# Patient Record
Sex: FEMALE | Race: WHITE | NOT HISPANIC OR LATINO | URBAN - METROPOLITAN AREA
[De-identification: names, ages, dates, MRNs, and addresses within clinical notes are randomized per-mention and may not be internally consistent; named-entity substitution may affect disease eponyms.]

---

## 2019-08-19 ENCOUNTER — OUTPATIENT (OUTPATIENT)
Dept: OUTPATIENT SERVICES | Facility: HOSPITAL | Age: 49
LOS: 1 days | Discharge: HOME | End: 2019-08-19
Payer: COMMERCIAL

## 2019-08-19 VITALS
TEMPERATURE: 97 F | RESPIRATION RATE: 16 BRPM | DIASTOLIC BLOOD PRESSURE: 71 MMHG | SYSTOLIC BLOOD PRESSURE: 116 MMHG | HEIGHT: 60 IN | OXYGEN SATURATION: 99 % | WEIGHT: 145.06 LBS | HEART RATE: 80 BPM

## 2019-08-19 DIAGNOSIS — Z98.890 OTHER SPECIFIED POSTPROCEDURAL STATES: Chronic | ICD-10-CM

## 2019-08-19 DIAGNOSIS — Z01.818 ENCOUNTER FOR OTHER PREPROCEDURAL EXAMINATION: ICD-10-CM

## 2019-08-19 DIAGNOSIS — M20.22 HALLUX RIGIDUS, LEFT FOOT: ICD-10-CM

## 2019-08-19 LAB
ALBUMIN SERPL ELPH-MCNC: 4.6 G/DL — SIGNIFICANT CHANGE UP (ref 3.5–5.2)
ALP SERPL-CCNC: 99 U/L — SIGNIFICANT CHANGE UP (ref 30–115)
ALT FLD-CCNC: 100 U/L — HIGH (ref 0–41)
ANION GAP SERPL CALC-SCNC: 14 MMOL/L — SIGNIFICANT CHANGE UP (ref 7–14)
APPEARANCE UR: ABNORMAL
AST SERPL-CCNC: 64 U/L — HIGH (ref 0–41)
BACTERIA # UR AUTO: ABNORMAL
BASOPHILS # BLD AUTO: 0.04 K/UL — SIGNIFICANT CHANGE UP (ref 0–0.2)
BASOPHILS NFR BLD AUTO: 0.8 % — SIGNIFICANT CHANGE UP (ref 0–1)
BILIRUB SERPL-MCNC: 0.5 MG/DL — SIGNIFICANT CHANGE UP (ref 0.2–1.2)
BILIRUB UR-MCNC: NEGATIVE — SIGNIFICANT CHANGE UP
BUN SERPL-MCNC: 11 MG/DL — SIGNIFICANT CHANGE UP (ref 10–20)
CALCIUM SERPL-MCNC: 9.4 MG/DL — SIGNIFICANT CHANGE UP (ref 8.5–10.1)
CHLORIDE SERPL-SCNC: 102 MMOL/L — SIGNIFICANT CHANGE UP (ref 98–110)
CO2 SERPL-SCNC: 28 MMOL/L — SIGNIFICANT CHANGE UP (ref 17–32)
COLOR SPEC: YELLOW — SIGNIFICANT CHANGE UP
CREAT SERPL-MCNC: 0.8 MG/DL — SIGNIFICANT CHANGE UP (ref 0.7–1.5)
DIFF PNL FLD: NEGATIVE — SIGNIFICANT CHANGE UP
EOSINOPHIL # BLD AUTO: 0.08 K/UL — SIGNIFICANT CHANGE UP (ref 0–0.7)
EOSINOPHIL NFR BLD AUTO: 1.6 % — SIGNIFICANT CHANGE UP (ref 0–8)
EPI CELLS # UR: 12 /HPF — HIGH (ref 0–5)
GLUCOSE SERPL-MCNC: 122 MG/DL — HIGH (ref 70–99)
GLUCOSE UR QL: NEGATIVE — SIGNIFICANT CHANGE UP
HCT VFR BLD CALC: 41.6 % — SIGNIFICANT CHANGE UP (ref 37–47)
HGB BLD-MCNC: 13.8 G/DL — SIGNIFICANT CHANGE UP (ref 12–16)
HYALINE CASTS # UR AUTO: NEGATIVE /LPF — SIGNIFICANT CHANGE UP (ref 0–7)
IMM GRANULOCYTES NFR BLD AUTO: 0.4 % — HIGH (ref 0.1–0.3)
KETONES UR-MCNC: NEGATIVE — SIGNIFICANT CHANGE UP
LEUKOCYTE ESTERASE UR-ACNC: ABNORMAL
LYMPHOCYTES # BLD AUTO: 1.34 K/UL — SIGNIFICANT CHANGE UP (ref 1.2–3.4)
LYMPHOCYTES # BLD AUTO: 26.2 % — SIGNIFICANT CHANGE UP (ref 20.5–51.1)
MCHC RBC-ENTMCNC: 28.3 PG — SIGNIFICANT CHANGE UP (ref 27–31)
MCHC RBC-ENTMCNC: 33.2 G/DL — SIGNIFICANT CHANGE UP (ref 32–37)
MCV RBC AUTO: 85.4 FL — SIGNIFICANT CHANGE UP (ref 81–99)
MONOCYTES # BLD AUTO: 0.37 K/UL — SIGNIFICANT CHANGE UP (ref 0.1–0.6)
MONOCYTES NFR BLD AUTO: 7.2 % — SIGNIFICANT CHANGE UP (ref 1.7–9.3)
NEUTROPHILS # BLD AUTO: 3.27 K/UL — SIGNIFICANT CHANGE UP (ref 1.4–6.5)
NEUTROPHILS NFR BLD AUTO: 63.8 % — SIGNIFICANT CHANGE UP (ref 42.2–75.2)
NITRITE UR-MCNC: NEGATIVE — SIGNIFICANT CHANGE UP
NRBC # BLD: 0 /100 WBCS — SIGNIFICANT CHANGE UP (ref 0–0)
PH UR: 6.5 — SIGNIFICANT CHANGE UP (ref 5–8)
PLATELET # BLD AUTO: 206 K/UL — SIGNIFICANT CHANGE UP (ref 130–400)
POTASSIUM SERPL-MCNC: 4.3 MMOL/L — SIGNIFICANT CHANGE UP (ref 3.5–5)
POTASSIUM SERPL-SCNC: 4.3 MMOL/L — SIGNIFICANT CHANGE UP (ref 3.5–5)
PROT SERPL-MCNC: 6.9 G/DL — SIGNIFICANT CHANGE UP (ref 6–8)
PROT UR-MCNC: NEGATIVE — SIGNIFICANT CHANGE UP
RBC # BLD: 4.87 M/UL — SIGNIFICANT CHANGE UP (ref 4.2–5.4)
RBC # FLD: 12.1 % — SIGNIFICANT CHANGE UP (ref 11.5–14.5)
RBC CASTS # UR COMP ASSIST: 2 /HPF — SIGNIFICANT CHANGE UP (ref 0–4)
SODIUM SERPL-SCNC: 144 MMOL/L — SIGNIFICANT CHANGE UP (ref 135–146)
SP GR SPEC: 1.02 — SIGNIFICANT CHANGE UP (ref 1.01–1.02)
UROBILINOGEN FLD QL: SIGNIFICANT CHANGE UP
WBC # BLD: 5.12 K/UL — SIGNIFICANT CHANGE UP (ref 4.8–10.8)
WBC # FLD AUTO: 5.12 K/UL — SIGNIFICANT CHANGE UP (ref 4.8–10.8)
WBC UR QL: 17 /HPF — HIGH (ref 0–5)

## 2019-08-19 PROCEDURE — 93010 ELECTROCARDIOGRAM REPORT: CPT

## 2019-08-19 NOTE — H&P PST ADULT - REASON FOR ADMISSION
49 yo female presents with c/o "painful bone on my left foot, big toe, he is going to shave the bone";  denies chest pain, palpitations, shortness of breath, dyspnea, or dysuria. exercise tolerance: 2+ blocks/ flights of stairs w/o sob

## 2019-08-22 ENCOUNTER — RESULT REVIEW (OUTPATIENT)
Age: 49
End: 2019-08-22

## 2019-08-22 ENCOUNTER — OUTPATIENT (OUTPATIENT)
Dept: OUTPATIENT SERVICES | Facility: HOSPITAL | Age: 49
LOS: 1 days | Discharge: HOME | End: 2019-08-22
Payer: COMMERCIAL

## 2019-08-22 VITALS
OXYGEN SATURATION: 99 % | HEIGHT: 60 IN | DIASTOLIC BLOOD PRESSURE: 70 MMHG | HEART RATE: 77 BPM | RESPIRATION RATE: 18 BRPM | TEMPERATURE: 98 F | WEIGHT: 145.06 LBS | SYSTOLIC BLOOD PRESSURE: 117 MMHG

## 2019-08-22 VITALS
RESPIRATION RATE: 22 BRPM | SYSTOLIC BLOOD PRESSURE: 118 MMHG | DIASTOLIC BLOOD PRESSURE: 59 MMHG | HEART RATE: 69 BPM | OXYGEN SATURATION: 100 %

## 2019-08-22 DIAGNOSIS — Z98.890 OTHER SPECIFIED POSTPROCEDURAL STATES: Chronic | ICD-10-CM

## 2019-08-22 PROCEDURE — 88311 DECALCIFY TISSUE: CPT | Mod: 26

## 2019-08-22 PROCEDURE — 88304 TISSUE EXAM BY PATHOLOGIST: CPT | Mod: 26

## 2019-08-22 RX ORDER — SODIUM CHLORIDE 9 MG/ML
1000 INJECTION, SOLUTION INTRAVENOUS
Refills: 0 | Status: DISCONTINUED | OUTPATIENT
Start: 2019-08-22 | End: 2019-09-14

## 2019-08-22 RX ORDER — HYDROMORPHONE HYDROCHLORIDE 2 MG/ML
0.5 INJECTION INTRAMUSCULAR; INTRAVENOUS; SUBCUTANEOUS
Refills: 0 | Status: DISCONTINUED | OUTPATIENT
Start: 2019-08-22 | End: 2019-08-22

## 2019-08-22 RX ORDER — ONDANSETRON 8 MG/1
4 TABLET, FILM COATED ORAL ONCE
Refills: 0 | Status: DISCONTINUED | OUTPATIENT
Start: 2019-08-22 | End: 2019-09-14

## 2019-08-22 RX ORDER — OXYCODONE AND ACETAMINOPHEN 5; 325 MG/1; MG/1
1 TABLET ORAL ONCE
Refills: 0 | Status: DISCONTINUED | OUTPATIENT
Start: 2019-08-22 | End: 2019-08-22

## 2019-08-22 RX ADMIN — OXYCODONE AND ACETAMINOPHEN 1 TABLET(S): 5; 325 TABLET ORAL at 11:05

## 2019-08-22 RX ADMIN — SODIUM CHLORIDE 100 MILLILITER(S): 9 INJECTION, SOLUTION INTRAVENOUS at 09:48

## 2019-08-22 NOTE — ASU DISCHARGE PLAN (ADULT/PEDIATRIC) - CALL YOUR DOCTOR IF YOU HAVE ANY OF THE FOLLOWING:
Nausea and vomiting that does not stop/Unable to urinate/Numbness, tingling, color or temperature change to extremity/Fever greater than (need to indicate Fahrenheit or Celsius)/Increased irritability or sluggishness/Bleeding that does not stop/Swelling that gets worse/Inability to tolerate liquids or foods/Wound/Surgical Site with redness, or foul smelling discharge or pus/Excessive diarrhea/Pain not relieved by Medications

## 2019-08-22 NOTE — PRE-ANESTHESIA EVALUATION ADULT - SPO2 (%)
Visit Information Date & Time Provider Department Dept. Phone Encounter #  
 10/24/2017  1:00 PM Mo Onofre, [address] [facility] Internal Medicine [phone] [id number] Your Appointments 11/20/2017  9:00 AM  
PHYSICAL PRE OP with Lester Dominguez MD  
[facility] Internal Medicine [facility]) Appt Note: physical  
 [address] [place] [address]  
[phone]  
  
   
 [address] Upcoming Health Maintenance Date Due Pneumococcal 19-64 Medium Risk (1 of 1 - PPSV23) 6/21/2007 PAP AKA CERVICAL CYTOLOGY 6/21/2009 DTaP/Tdap/Td series (2 - Td) 6/19/2024 Allergies as of 10/24/2017  Review Complete On: 10/24/2017 By: Ya Larkin LPN Severity Noted Reaction Type Reactions Pcn [Penicillins]  08/15/2010    Swelling \"convulsions\" Current Immunizations  Never Reviewed Name Date Tdap 6/19/2014  8:12 PM  
  
 Not reviewed this visit You Were Diagnosed With   
  
 Codes Comments Strep pharyngitis    -  Primary ICD-10-CM: J02.0 ICD-9-CM: 034.0 Vitals BP Pulse Temp Resp Height(growth percentile) Weight(growth percentile) 108/64 (BP 1 Location: Left arm, BP Patient Position: Sitting) (!) 114 (!) 103.1 °F (39.5 °C) (Oral) 18 4' 11\" (1.499 m) 219 lb 6.4 oz (99.5 kg) LMP SpO2 BMI OB Status Smoking Status 09/20/2017 (Exact Date) 96% 44.31 kg/m2 Unknown Never Smoker Vitals History BMI and BSA Data Body Mass Index Body Surface Area 44.31 kg/m 2 2.04 m 2 Preferred Pharmacy Pharmacy Name Phone [facility]ACY [id number]- [place] - [address] [phone] Your Updated Medication List  
  
   
This list is accurate as of: 10/24/17  1:35 PM.  Always use your most recent med list.  
  
  
  
  
 albuterol 90 mcg/actuation inhaler Commonly known as:  PROVENTIL HFA, VENTOLIN HFA, PROAIR HFA  
 Take 1 Puff by inhalation every six (6) hours as needed for Wheezing. azithromycin 500 mg Tab Commonly known as:  Perales Congress Take 1 Tab by mouth See Admin Instructions for 5 days. hydroCHLOROthiazide 25 mg tablet Commonly known as:  HYDRODIURIL  
TAKE 1 TAB BY MOUTH DAILY. Prescriptions Sent to Pharmacy Refills  
 azithromycin (ZITHROMAX) 500 mg tab 0 Sig: Take 1 Tab by mouth See Admin Instructions for 5 days. Class: Normal  
 Pharmacy: [facility]/pharmacy [address] [facility] [address] Ph #: [phone] Route: Oral  
  
We Performed the Following AMB POC RAPID STREP A [23901 CPT(R)] Introducing [facility] & HEALTH SERVICES! [employer] introduces Hot Dot patient portal. Now you can access parts of your medical record, email your doctor's office, and request medication refills online. 1. In your internet browser, go to [email or web address] 2. Click on the First Time User? Click Here link in the Sign In box. You will see the New Member Sign Up page. 3. Enter your Hot Dot Access Code exactly as it appears below. You will not need to use this code after youve completed the sign-up process. If you do not sign up before the expiration date, you must request a new code. · Hot Dot Access Code: [email or web address] Expires: 11/13/2017  5:59 PM 
 
4. Enter the last four digits of your Social Security Number (xxxx) and Date of Birth (mm/dd/yyyy) as indicated and click Submit. You will be taken to the next sign-up page. 5. Create a [employer] ID. This will be your Hot Dot login ID and cannot be changed, so think of one that is secure and easy to remember. 6. Create a Hot Dot password. You can change your password at any time. 7. Enter your Password Reset Question and Answer. This can be used at a later time if you forget your password. 8. Enter your e-mail address.  You will receive e-mail notification when new information is available in Shadow Puppet. 9. Click Sign Up. You can now view and download portions of your medical record. 10. Click the Download Summary menu link to download a portable copy of your medical information. If you have questions, please visit the Frequently Asked Questions section of the Shadow Puppet website. Remember, [employer] P[employer]et is NOT to be used for urgent needs. For medical emergencies, dial 911. Now available from your iPhone and Android! Please provide this summary of care documentation to your next provider. Your primary care clinician is listed as Wolf Mcdowell. If you have any questions after today's visit, please call [phone]. 99

## 2019-08-22 NOTE — PRE-ANESTHESIA EVALUATION ADULT - NSANTHOSAYNRD_GEN_A_CORE
No. VIN screening performed.  STOP BANG Legend: 0-2 = LOW Risk; 3-4 = INTERMEDIATE Risk; 5-8 = HIGH Risk/never tested, see screening tool

## 2019-08-22 NOTE — CHART NOTE - NSCHARTNOTEFT_GEN_A_CORE
Anesthesia Post Op Assessment  		(    ) Intubated           TV __SV___	Rate __15___	FiO2_NC 3L____  		(  x  ) Patent airway. Full return of protective reflexes  		(  x  )Full recovery from anesthesia/sedation to baseline status      Cardiovascular Function:  		BP:  101/56		                  Pulse:  72		                  RR:	15	                  Temp:	 97.7	                  O2Sat:   100%      Mental Status:  	        (  x  ) awake		  (    ) alert		 (    ) drowsy	               (    ) sedated      Nausea/Vomiting:  		(    ) Yes, See post-op orders		   (  x  ) No      Pain Scale: (0-10):			Treatment:     (    ) None	            (  x  ) See Post-Op/PCA Orders      Post-operative Fluids: 	   (    ) Oral	          (  x  ) See post-op Orders        Comments:    Uneventful. No complications from anesthesia.  Discharge when criteria met.

## 2019-08-22 NOTE — ASU DISCHARGE PLAN (ADULT/PEDIATRIC) - FOLLOW UP APPOINTMENTS
911 or go to the nearest Emergency Room [facility]:  [facility]…

## 2019-08-23 LAB — SURGICAL PATHOLOGY STUDY: SIGNIFICANT CHANGE UP

## 2019-08-27 DIAGNOSIS — M20.22 HALLUX RIGIDUS, LEFT FOOT: ICD-10-CM

## 2022-03-22 PROBLEM — Z00.00 ENCOUNTER FOR PREVENTIVE HEALTH EXAMINATION: Status: ACTIVE | Noted: 2022-03-22

## 2022-03-22 PROBLEM — M21.612 BUNION OF LEFT FOOT: Chronic | Status: ACTIVE | Noted: 2019-08-19

## 2022-03-24 ENCOUNTER — OUTPATIENT (OUTPATIENT)
Dept: OUTPATIENT SERVICES | Facility: HOSPITAL | Age: 52
LOS: 1 days | Discharge: HOME | End: 2022-03-24
Payer: COMMERCIAL

## 2022-03-24 ENCOUNTER — TRANSCRIPTION ENCOUNTER (OUTPATIENT)
Age: 52
End: 2022-03-24

## 2022-03-24 ENCOUNTER — RESULT REVIEW (OUTPATIENT)
Age: 52
End: 2022-03-24

## 2022-03-24 DIAGNOSIS — M25.552 PAIN IN LEFT HIP: ICD-10-CM

## 2022-03-24 DIAGNOSIS — Z98.890 OTHER SPECIFIED POSTPROCEDURAL STATES: Chronic | ICD-10-CM

## 2022-03-24 PROCEDURE — 73721 MRI JNT OF LWR EXTRE W/O DYE: CPT | Mod: 26,LT

## 2022-04-11 ENCOUNTER — APPOINTMENT (OUTPATIENT)
Dept: SURGERY | Facility: CLINIC | Age: 52
End: 2022-04-11
Payer: COMMERCIAL

## 2022-04-11 VITALS
HEIGHT: 61 IN | DIASTOLIC BLOOD PRESSURE: 52 MMHG | BODY MASS INDEX: 26.81 KG/M2 | SYSTOLIC BLOOD PRESSURE: 110 MMHG | OXYGEN SATURATION: 98 % | HEART RATE: 85 BPM | TEMPERATURE: 98.2 F | WEIGHT: 142 LBS

## 2022-04-11 DIAGNOSIS — Z78.9 OTHER SPECIFIED HEALTH STATUS: ICD-10-CM

## 2022-04-11 DIAGNOSIS — Z80.3 FAMILY HISTORY OF MALIGNANT NEOPLASM OF BREAST: ICD-10-CM

## 2022-04-11 DIAGNOSIS — Z80.8 FAMILY HISTORY OF MALIGNANT NEOPLASM OF OTHER ORGANS OR SYSTEMS: ICD-10-CM

## 2022-04-11 DIAGNOSIS — N80.9 ENDOMETRIOSIS, UNSPECIFIED: ICD-10-CM

## 2022-04-11 PROCEDURE — 99202 OFFICE O/P NEW SF 15 MIN: CPT

## 2022-04-12 ENCOUNTER — NON-APPOINTMENT (OUTPATIENT)
Age: 52
End: 2022-04-12

## 2022-04-12 PROBLEM — Z80.8 FAMILY HISTORY OF BASAL CELL CARCINOMA (BCC): Status: ACTIVE | Noted: 2022-04-11

## 2022-04-12 PROBLEM — Z80.3 FAMILY HISTORY OF MALIGNANT NEOPLASM OF BREAST: Status: ACTIVE | Noted: 2022-04-11

## 2022-04-12 PROBLEM — N80.9 ENDOMETRIOSIS: Status: ACTIVE | Noted: 2022-04-11

## 2022-04-12 PROBLEM — Z78.9 NON-SMOKER: Status: ACTIVE | Noted: 2022-04-11

## 2022-04-12 RX ORDER — NEOMYCIN AND POLYMYXIN B SULFATES AND DEXAMETHASONE 3.5; 10000; 1 MG/G; [IU]/G; MG/G
3.5-10000-0.1 OINTMENT OPHTHALMIC
Qty: 3 | Refills: 0 | Status: ACTIVE | COMMUNITY
Start: 2022-01-17

## 2022-04-12 RX ORDER — MELOXICAM 7.5 MG/1
7.5 TABLET ORAL
Qty: 30 | Refills: 0 | Status: ACTIVE | COMMUNITY
Start: 2022-02-01

## 2022-04-12 NOTE — HISTORY OF PRESENT ILLNESS
[de-identified] : The patient is referred by her dermatologist for excision of a lipoma of her back. This was first felt by a physical therapist she was seen for treatment following some blunt trauma unrelated to the current complaint. This was about 2 months ago and she believes it may have enlarged. It is occasionally painful and is bothersome due to pressure from her bra and other objects.  There is no history of local trauma or infection.

## 2022-04-12 NOTE — PHYSICAL EXAM
[Normal Thyroid] : the thyroid was normal [Normal Breath Sounds] : Normal breath sounds [Normal Heart Sounds] : normal heart sounds [Normal Rate and Rhythm] : normal rate and rhythm [No HSM] : no hepatosplenomegaly [Abdominal Masses] : No abdominal masses [Abdomen Tenderness] : ~T ~M No abdominal tenderness [de-identified] : healthy [de-identified] : no adenopathy [de-identified] : 4.5 CM discoid subcutaneous mass overlying the left scapula, soft and mobile, nontender and not associated with any overlying skin changes. This is directly under her bra line. No axillary adenopathy bilaterally.

## 2022-04-12 NOTE — ASSESSMENT
[FreeTextEntry1] : Benign appearing subcutaneous mass of the left scapular region of the back, consistent with a lipoma, and for which excision was advised. The rationale for this was discussed in full, and the procedure was also explained in detail. All her questions were answered, she understands and agrees, and a surgical consent was obtained at this office visit. She does not wish to proceed with observation. She will contact the office for scheduling.

## 2022-04-19 ENCOUNTER — LABORATORY RESULT (OUTPATIENT)
Age: 52
End: 2022-04-19

## 2022-04-21 NOTE — ASU PATIENT PROFILE, ADULT - FALL HARM RISK - UNIVERSAL INTERVENTIONS
Bed in lowest position, wheels locked, appropriate side rails in place/Call bell, personal items and telephone in reach/Instruct patient to call for assistance before getting out of bed or chair/Non-slip footwear when patient is out of bed/[place] to call system/Physically safe environment - no spills, clutter or unnecessary equipment/Purposeful Proactive Rounding/Room/bathroom lighting operational, light cord in reach

## 2022-04-22 ENCOUNTER — TRANSCRIPTION ENCOUNTER (OUTPATIENT)
Age: 52
End: 2022-04-22

## 2022-04-22 ENCOUNTER — OUTPATIENT (OUTPATIENT)
Dept: OUTPATIENT SERVICES | Facility: HOSPITAL | Age: 52
LOS: 1 days | Discharge: HOME | End: 2022-04-22
Payer: COMMERCIAL

## 2022-04-22 ENCOUNTER — RESULT REVIEW (OUTPATIENT)
Age: 52
End: 2022-04-22

## 2022-04-22 ENCOUNTER — APPOINTMENT (OUTPATIENT)
Dept: SURGERY | Facility: AMBULATORY SURGERY CENTER | Age: 52
End: 2022-04-22

## 2022-04-22 VITALS
OXYGEN SATURATION: 100 % | RESPIRATION RATE: 13 BRPM | DIASTOLIC BLOOD PRESSURE: 61 MMHG | SYSTOLIC BLOOD PRESSURE: 100 MMHG | HEART RATE: 61 BPM

## 2022-04-22 VITALS
SYSTOLIC BLOOD PRESSURE: 107 MMHG | HEIGHT: 60 IN | RESPIRATION RATE: 18 BRPM | TEMPERATURE: 99 F | DIASTOLIC BLOOD PRESSURE: 60 MMHG | HEART RATE: 82 BPM | WEIGHT: 136.03 LBS | OXYGEN SATURATION: 97 %

## 2022-04-22 DIAGNOSIS — Z98.890 OTHER SPECIFIED POSTPROCEDURAL STATES: Chronic | ICD-10-CM

## 2022-04-22 PROCEDURE — 21931 EXC BACK LES SC 3 CM/>: CPT

## 2022-04-22 PROCEDURE — 88304 TISSUE EXAM BY PATHOLOGIST: CPT | Mod: 26

## 2022-04-22 PROCEDURE — 12032 INTMD RPR S/A/T/EXT 2.6-7.5: CPT | Mod: 59

## 2022-04-22 RX ORDER — ONDANSETRON 8 MG/1
4 TABLET, FILM COATED ORAL ONCE
Refills: 0 | Status: DISCONTINUED | OUTPATIENT
Start: 2022-04-22 | End: 2022-05-07

## 2022-04-22 RX ORDER — PREGABALIN 225 MG/1
1 CAPSULE ORAL
Qty: 0 | Refills: 0 | DISCHARGE

## 2022-04-22 RX ORDER — SODIUM CHLORIDE 9 MG/ML
1000 INJECTION, SOLUTION INTRAVENOUS
Refills: 0 | Status: DISCONTINUED | OUTPATIENT
Start: 2022-04-22 | End: 2022-05-07

## 2022-04-22 RX ORDER — ACETAMINOPHEN WITH CODEINE 300MG-30MG
2 TABLET ORAL
Qty: 24 | Refills: 0
Start: 2022-04-22 | End: 2022-04-24

## 2022-04-22 RX ORDER — HYDROMORPHONE HYDROCHLORIDE 2 MG/ML
0.5 INJECTION INTRAMUSCULAR; INTRAVENOUS; SUBCUTANEOUS
Refills: 0 | Status: DISCONTINUED | OUTPATIENT
Start: 2022-04-22 | End: 2022-04-22

## 2022-04-22 RX ADMIN — SODIUM CHLORIDE 100 MILLILITER(S): 9 INJECTION, SOLUTION INTRAVENOUS at 16:02

## 2022-04-22 NOTE — ASU DISCHARGE PLAN (ADULT/PEDIATRIC) - PATIENT EDUCATION MATERIALS PROVIED
Provider pre-printed instructions given Pain managemnt/Provider pre-printed instructions given/Pre-printed instructions given for other (specify)

## 2022-04-22 NOTE — CHART NOTE - NSCHARTNOTEFT_GEN_A_CORE
PACU ANESTHESIA ADMISSION NOTE      Procedure: excision of soft tissue tumor to left upper back  Post op diagnosis:  Left upper back soft tissue tumor      __x__  Patent Airway    ____  Full return of protective reflexes    ____  Full recovery from anesthesia / back to baseline     Vitals:   see anesthesia record      Mental Status:  __x__ Awake   _____ Alert   _____ Drowsy   _____ Sedated    Nausea/Vomiting:  __x__ NO  ______Yes,   See Post - Op Orders          Pain Scale (0-10):  _____    Treatment: ____ None    ___x_ See Post - Op/PCA Orders    Post - Operative Fluids:   ____ Oral   __x__ See Post - Op Orders    Plan: Discharge:   __x__Home       _____Floor     _____Critical Care    _____  Other:_________________    Comments:  Uneventful intraoperative course. No anesthesia issues or complications noted. Patient stable upon arrival to PACU. Report given to RN. Discharge when criteria met.

## 2022-04-22 NOTE — ASU DISCHARGE PLAN (ADULT/PEDIATRIC) - CARE PROVIDER_API CALL
Jeff Valdez (MD)  Surgery  [address] [place] [address], [address]rd [place] [address]  Phone: [phone]  Fax: [phone]  Follow Up Time:

## 2022-04-22 NOTE — BRIEF OPERATIVE NOTE - NSICDXBRIEFPROCEDURE_GEN_ALL_CORE_FT
PROCEDURES:  Excision, subcutaneous tumor, back, 3 cm or greater 22-Apr-2022 16:08:32  Tarik Fleming

## 2022-04-22 NOTE — ASU DISCHARGE PLAN (ADULT/PEDIATRIC) - ASU DC SPECIAL INSTRUCTIONSFT
SURGERY DISCHARGE INSTRUCTIONS    FOLLOW-UP - with Dr. Valdez as previously scheduled    ACTIVITY- No heavy lifting until follow up appointment. Walking is encouraged. No running or swimming. No driving while taking pain medication.    WOUNDCARE - You have clear outer plastic dressing with gauze- remove 3 days after surgery and leave little white bandage strips underneath it on for 7 days. May shower 24 hours after surgery but no submerging wound under water for 2 weeks (tub bathing). Pat area dry when wet, keep clean and dry. Do not apply powders or lotion to wound area.     COLOSTOMY - If you have a colostomy follow your specific intstructions.    DRAINS- if you have drains, number or label each drain, for example drain 1 and drain 2. measure the output from each of them as needed when they become full or when you remember, for example once in the morning and once at night. Write down the total amount from each drain daily and bring this to your appointment.    PAIN MEDS - You have been prescribed Tylenol with Codeine, take as needed. Take over the counter extra strength tylenol 500mg and/or ibprofen 400mg with food every 6 hours for pain instead of prescription pain meds if you do not need stronger pain control. Do not take tylenol in addition to your prescription pain med if your prescription pain med already has tylenol in it. No more than 4g of tylenol in 24hrs or 1g in 4 hrs. No mixing alcohol with prescription pain meds.

## 2022-04-22 NOTE — ASU DISCHARGE PLAN (ADULT/PEDIATRIC) - NS MD DC FALL RISK RISK
For information on Fall & Injury Prevention, visit: [email or web address] OR  [email or web address] OR  https://www.cdc.gov/steadi/patient.html

## 2022-04-22 NOTE — ASU DISCHARGE PLAN (ADULT/PEDIATRIC) - FOLLOW UP APPOINTMENTS
[facility],  Endoscopy/Ambulatory Surgery [facility]:  [facility]

## 2022-04-27 LAB — SURGICAL PATHOLOGY STUDY: SIGNIFICANT CHANGE UP

## 2022-04-28 DIAGNOSIS — Z88.0 ALLERGY STATUS TO PENICILLIN: ICD-10-CM

## 2022-04-28 DIAGNOSIS — D49.89 NEOPLASM OF UNSPECIFIED BEHAVIOR OF OTHER SPECIFIED SITES: ICD-10-CM

## 2022-04-28 DIAGNOSIS — D17.1 BENIGN LIPOMATOUS NEOPLASM OF SKIN AND SUBCUTANEOUS TISSUE OF TRUNK: ICD-10-CM

## 2022-05-22 ENCOUNTER — EMERGENCY (EMERGENCY)
Facility: HOSPITAL | Age: 52
LOS: 0 days | Discharge: HOME | End: 2022-05-23
Attending: STUDENT IN AN ORGANIZED HEALTH CARE EDUCATION/TRAINING PROGRAM | Admitting: STUDENT IN AN ORGANIZED HEALTH CARE EDUCATION/TRAINING PROGRAM
Payer: COMMERCIAL

## 2022-05-22 VITALS
OXYGEN SATURATION: 98 % | SYSTOLIC BLOOD PRESSURE: 156 MMHG | TEMPERATURE: 98 F | WEIGHT: 136.03 LBS | DIASTOLIC BLOOD PRESSURE: 70 MMHG | HEIGHT: 60 IN | HEART RATE: 81 BPM | RESPIRATION RATE: 18 BRPM

## 2022-05-22 DIAGNOSIS — Z98.890 OTHER SPECIFIED POSTPROCEDURAL STATES: Chronic | ICD-10-CM

## 2022-05-22 DIAGNOSIS — Z88.0 ALLERGY STATUS TO PENICILLIN: ICD-10-CM
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
## 2022-05-22 DIAGNOSIS — S80.12XA CONTUSION OF LEFT LOWER LEG, INITIAL ENCOUNTER: ICD-10-CM
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
## 2022-05-22 DIAGNOSIS — S90.02XA CONTUSION OF LEFT ANKLE, INITIAL ENCOUNTER: ICD-10-CM

## 2022-05-22 PROCEDURE — 99284 EMERGENCY DEPT VISIT MOD MDM: CPT

## 2022-05-22 PROCEDURE — 93970 EXTREMITY STUDY: CPT | Mod: 26

## 2022-05-22 PROCEDURE — 73562 X-RAY EXAM OF KNEE 3: CPT | Mod: 26,LT

## 2022-05-22 NOTE — ED PROVIDER NOTE - CLINICAL SUMMARY MEDICAL DECISION MAKING FREE TEXT BOX
imaging negative, no e/o dvt, no fx, likely contusion/tracking of hematoma from knee to ankle. no e/o infection. will use referral coordinator for ortho f/u,

## 2022-05-22 NOTE — ED PROVIDER NOTE - ATTENDING APP SHARED VISIT CONTRIBUTION OF CARE
50 yo f no pmh, ac/antiplt  2 weeks ago, hit knee on coffee table and fell. last week, pt was kneeling in Hindu and had increasing swelling to knee w/ mild pain.  over the past 2 days, pt w/ bruising/color changing down leg to knee. no pain to ankle. pt now walking well but has mild pain w/ walking.    vss  gen- NAD, aaox3  card-rrr  lungs-ctab, no wheezing or rhonchi  abd-sntnd, no guarding or rebound  neuro- full str/sensation, cn ii-xii grossly intact, normal coordination  MSK- R knee- mild knee tenderness, FROM to knee, no shin tenderness, no ankle tenderness, mild ecchymosis to ankle    will get xr knee, duplex

## 2022-05-22 NOTE — ED PROVIDER NOTE - OBJECTIVE STATEMENT
50 yo F with no pmhx presenting for evaluation of bruising to left lower leg that patient noticed today. Pt denies any injury today but states that 2 weeks ago she was kneeling in Anabaptism and had pain and swelling to left knee. Patient able to ambulate. No paresthesias or weakness. No fevers, chills, cp, or sob.

## 2022-05-22 NOTE — ED PROVIDER NOTE - NSFOLLOWUPINSTRUCTIONS_ED_ALL_ED_FT
Our Emergency Department Referral Coordinators will be reaching out to you in the next 24-48 hours from 9:00am to 5:00pm with a follow up appointment. Please expect a phone call from the hospital in that time frame. If you do not receive a call or if you have any questions or concerns, you can reach them at [phone] or [phone].     Contusion    A contusion is a deep bruise. Contusions are the result of a blunt injury to tissues and muscle fibers under the skin. The skin overlying the contusion may turn blue, purple, or yellow. Symptoms also include pain and swelling in the injured area.    SEEK IMMEDIATE MEDICAL CARE IF YOU HAVE THE FOLLOWING SYMPTOMS: severe pain, numbness, tingling, pain, weakness, or skin color/temperature change in any part of your body distal to the fracture.

## 2022-05-22 NOTE — ED PROVIDER NOTE - PATIENT PORTAL LINK FT
You can access the FollowMyHealth Patient Portal offered by [facility] by registering at the following website: [email or web address]. By joining [employer]’s FollowMyHealth portal, you will also be able to view your health information using other applications (apps) compatible with our system.

## 2022-05-22 NOTE — ED PROVIDER NOTE - NS ED ROS FT
Review of Systems:  	•	CONSTITUTIONAL - no fever, no diaphoresis, no chills  	•	SKIN - no rash  	•	HEMATOLOGIC - no bleeding, + bruising  	•	EYES - no eye pain, no blurry vision  	•	ENT - no congestion  	•	RESPIRATORY - no shortness of breath, no cough  	•	CARDIAC - no chest pain, no palpitations  	•	GI - no abd pain, no nausea, no vomiting, no diarrhea, no constipation  	•	GENITO-URINARY - no dysuria; no hematuria, no increased urinary frequency  	•	MUSCULOSKELETAL - no joint paint, no swelling, no redness  	•	NEUROLOGIC - no weakness, no headache, no paresthesias, no LOC  	•	PSYCH - no anxiety, no depression  	All other ROS are negative except as documented in HPI.

## 2022-05-22 NOTE — ED PROVIDER NOTE - PHYSICAL EXAMINATION
VITAL SIGNS: I have reviewed nursing notes and confirm.  CONSTITUTIONAL: Patient is in no acute distress.  SKIN: Skin exam is warm and dry, no acute rash.  HEAD: Normocephalic; atraumatic.  EYES: PERRL, EOM intact; conjunctiva and sclera clear.  ENT: No nasal discharge; airway clear.   NECK: Supple; non tender.  CARD: S1, S2 normal; no murmurs, gallops, or rubs. Regular rate and rhythm.  RESP: Clear to auscultation bilaterally. No wheezes, rales or rhonchi.  ABD: Normal bowel sounds; soft; non-distended; non-tender.   EXT: Normal ROM. No edema. Mild left knee tenderness. Full ROM. +Ecchymosis noted to left ankle. No swelling.   LYMPH: No acute cervical adenopathy.  NEURO: Alert, oriented. Grossly unremarkable. No focal deficits.  PSYCH: Cooperative, appropriate.

## 2022-05-22 NOTE — ED PROVIDER NOTE - NS ED ATTENDING STATEMENT MOD
This was a shared visit with the SP. I reviewed and verified the documentation and independently performed the documented:

## 2022-05-22 NOTE — ED ADULT TRIAGE NOTE - CHIEF COMPLAINT QUOTE
Two weeks ago I banged my knee on the coffee table, last week I knelt in the Orthodox and it was swollen, today my lower leg is black and blue and its spread in the last two hours to my ankle - patient

## 2022-05-23 NOTE — ED ADULT NURSE NOTE - CHIEF COMPLAINT QUOTE
Two weeks ago I banged my knee on the coffee table, last week I knelt in the Jainism and it was swollen, today my lower leg is black and blue and its spread in the last two hours to my ankle - patient

## 2022-06-02 ENCOUNTER — APPOINTMENT (OUTPATIENT)
Dept: SURGERY | Facility: CLINIC | Age: 52
End: 2022-06-02
Payer: COMMERCIAL

## 2022-06-02 VITALS
OXYGEN SATURATION: 98 % | TEMPERATURE: 97.2 F | BODY MASS INDEX: 25.68 KG/M2 | DIASTOLIC BLOOD PRESSURE: 76 MMHG | HEIGHT: 61 IN | HEART RATE: 82 BPM | SYSTOLIC BLOOD PRESSURE: 110 MMHG | WEIGHT: 136 LBS

## 2022-06-02 DIAGNOSIS — D17.1 BENIGN LIPOMATOUS NEOPLASM OF SKIN AND SUBCUTANEOUS TISSUE OF TRUNK: ICD-10-CM

## 2022-06-02 PROCEDURE — 99024 POSTOP FOLLOW-UP VISIT: CPT

## 2022-06-02 RX ORDER — ACETAMINOPHEN AND CODEINE PHOSPHATE 300; 15 MG/1; MG/1
300-15 TABLET ORAL
Qty: 24 | Refills: 0 | Status: ACTIVE | COMMUNITY
Start: 2022-04-22

## 2022-06-02 RX ORDER — AZITHROMYCIN 250 MG/1
250 TABLET, FILM COATED ORAL
Qty: 6 | Refills: 0 | Status: ACTIVE | COMMUNITY
Start: 2022-05-02

## 2022-06-02 RX ORDER — METHYLPREDNISOLONE 4 MG/1
4 TABLET ORAL
Qty: 21 | Refills: 0 | Status: ACTIVE | COMMUNITY
Start: 2022-05-02

## 2022-06-02 NOTE — PHYSICAL EXAM
[de-identified] : healthy [de-identified] : Transverse incision left upper back is clean and dry, suture fragment removed from medial and without difficulty. Wound cleaned and redressed.

## 2022-06-02 NOTE — ASSESSMENT
[FreeTextEntry1] : No postoperative problems noted, local care and activity instructions were reviewed and all her questions were answered. She will return here p.r.n.

## 2022-06-02 NOTE — HISTORY OF PRESENT ILLNESS
[de-identified] : First postoperative visit after excision of lipoma of the left upper back. The patient looks and feels well and has no specific complaints.

## 2022-06-20 NOTE — PRE-ANESTHESIA EVALUATION ADULT - NSANTHINDVINFOSD_GEN_ALL_CORE
"Requested Prescriptions   Pending Prescriptions Disp Refills     simvastatin (ZOCOR) 40 MG tablet [Pharmacy Med Name: SIMVASTATIN 40MG TABLETS] 90 tablet 3     Sig: TAKE 1 TABLET(40 MG) BY MOUTH AT BEDTIME       Statins Protocol Failed - 6/19/2022  6:31 AM        Failed - LDL on file in past 12 months     Recent Labs   Lab Test 06/03/21  0828   LDL 89             Passed - No abnormal creatine kinase in past 12 months     No lab results found.             Passed - Recent (12 mo) or future (30 days) visit within the authorizing provider's specialty     Patient has had an office visit with the authorizing provider or a provider within the authorizing providers department within the previous 12 mos or has a future within next 30 days. See \"Patient Info\" tab in inbasket, or \"Choose Columns\" in Meds & Orders section of the refill encounter.              Passed - Medication is active on med list        Passed - Patient is age 18 or older           pregabalin (LYRICA) 150 MG capsule [Pharmacy Med Name: PREGABALIN 150MG CAPSULES] 270 capsule      Sig: TAKE 1 CAPSULE BY MOUTH THREE TIMES DAILY       There is no refill protocol information for this order      Signed Prescriptions Disp Refills    losartan (COZAAR) 100 MG tablet 90 tablet 3     Sig: TAKE 1 TABLET(100 MG) BY MOUTH DAILY       Angiotensin-II Receptors Passed - 6/19/2022  6:31 AM        Passed - Last blood pressure under 140/90 in past 12 months     BP Readings from Last 3 Encounters:   04/29/22 124/78   03/18/22 138/89   06/03/21 135/88                 Passed - Recent (12 mo) or future (30 days) visit within the authorizing provider's specialty     Patient has had an office visit with the authorizing provider or a provider within the authorizing providers department within the previous 12 mos or has a future within next 30 days. See \"Patient Info\" tab in inbasket, or \"Choose Columns\" in Meds & Orders section of the refill encounter.              Passed - " "Medication is active on med list        Passed - Patient is age 18 or older        Passed - Normal serum creatinine on file in past 12 months     Recent Labs   Lab Test 03/18/22  0833   CR 0.80       Ok to refill medication if creatinine is low          Passed - Normal serum potassium on file in past 12 months     Recent Labs   Lab Test 03/18/22  0833   POTASSIUM 4.3                      sertraline (ZOLOFT) 100 MG tablet 90 tablet 3     Sig: TAKE 1 TABLET(100 MG) BY MOUTH DAILY       SSRIs Protocol Passed - 6/19/2022  6:31 AM        Passed - Recent (12 mo) or future (30 days) visit within the authorizing provider's specialty     Patient has had an office visit with the authorizing provider or a provider within the authorizing providers department within the previous 12 mos or has a future within next 30 days. See \"Patient Info\" tab in inbasket, or \"Choose Columns\" in Meds & Orders section of the refill encounter.              Passed - Medication is active on med list        Passed - Patient is age 18 or older           Routing refill request to provider for review/approval because:  Labs not current:  LDL  Not on [facility] protocol    Krystyna Smith RN  [facility]           " patient

## 2022-09-27 ENCOUNTER — APPOINTMENT (OUTPATIENT)
Dept: ORTHOPEDIC SURGERY | Facility: CLINIC | Age: 52
End: 2022-09-27

## 2022-09-27 PROCEDURE — 20610 DRAIN/INJ JOINT/BURSA W/O US: CPT

## 2022-09-27 PROCEDURE — 99213 OFFICE O/P EST LOW 20 MIN: CPT | Mod: 25

## 2022-09-27 NOTE — HISTORY OF PRESENT ILLNESS
[de-identified] : left knee\par \par Knee pain\par \par Reports knee pain not well controlled with medication and therapeutic modalities alone.\par Notes difficulty with ADL's secondary to knee pain.\par Pain related to activity but only partially improved with rest, medications, modalities.\par \par Past Medical History is unchanged, all medical issues and medications are stable.\par \par Review of systems is negative for fevers, chills, chest pain, shortness of breath, unexplained weight fluctuations, GI or  symptoms.\par \par Smoking history and social habits are unchanged.\par \par Exam:\par JLT noted, guarding with ROM, decreased quad extension strength, no contracture, no gross instability.\par \par \par Imaging:\par Prior imaging on chart reviewed\par \par Impression is knee pain likely do to degenerative changes and internal derangement.\par \par Plan for injection\par \par Prior to injection, risks and benefits of the procedure were discussed, including but not limited to pain, swelling, failure to improve symptoms and in rare cases infection or allergic reaction. \par The knee was prepped and draped with betadine and alcohol.  Using sterile technique 1cc of 40mg/cc kenalog solution mixed with 4cc of 1% lidocaine was injected thru an anterolateral portal using a 22guage needle.  Upon withdrawing the needle pressure was applied with to the injection site for approximately 1 minute.  Once hemostasis was achieved a band-aid dressing was applied.  The patient tolerated the procedure well.\par \par followup in 4-6 months or as needed.\par

## 2024-11-22 ENCOUNTER — APPOINTMENT (OUTPATIENT)
Dept: ORTHOPEDIC SURGERY | Facility: CLINIC | Age: 54
End: 2024-11-22
Payer: COMMERCIAL

## 2024-11-22 DIAGNOSIS — M25.562 PAIN IN RIGHT KNEE: ICD-10-CM

## 2024-11-22 DIAGNOSIS — M25.561 PAIN IN RIGHT KNEE: ICD-10-CM

## 2024-11-22 PROCEDURE — 73562 X-RAY EXAM OF KNEE 3: CPT | Mod: 50

## 2024-11-22 PROCEDURE — 99213 OFFICE O/P EST LOW 20 MIN: CPT | Mod: 25

## 2024-11-22 PROCEDURE — 20610 DRAIN/INJ JOINT/BURSA W/O US: CPT | Mod: LT

## 2024-11-22 RX ORDER — MELOXICAM 15 MG/1
15 TABLET ORAL
Qty: 30 | Refills: 0 | Status: ACTIVE | COMMUNITY
Start: 2024-11-22 | End: 1900-01-01

## 2024-12-17 ENCOUNTER — APPOINTMENT (OUTPATIENT)
Dept: ORTHOPEDIC SURGERY | Facility: CLINIC | Age: 54
End: 2024-12-17
Payer: COMMERCIAL

## 2024-12-17 DIAGNOSIS — M25.562 PAIN IN LEFT KNEE: ICD-10-CM

## 2024-12-17 PROCEDURE — 99213 OFFICE O/P EST LOW 20 MIN: CPT

## 2025-01-02 ENCOUNTER — APPOINTMENT (OUTPATIENT)
Facility: CLINIC | Age: 55
End: 2025-01-02
Payer: COMMERCIAL

## 2025-01-02 DIAGNOSIS — M94.269 CHONDROMALACIA, UNSPECIFIED KNEE: ICD-10-CM

## 2025-01-02 PROCEDURE — 99213 OFFICE O/P EST LOW 20 MIN: CPT

## 2025-01-02 RX ORDER — MELOXICAM 15 MG/1
15 TABLET ORAL
Qty: 30 | Refills: 1 | Status: ACTIVE | COMMUNITY
Start: 2025-01-02 | End: 1900-01-01

## 2025-02-20 ENCOUNTER — APPOINTMENT (OUTPATIENT)
Facility: CLINIC | Age: 55
End: 2025-02-20
Payer: COMMERCIAL

## 2025-02-20 DIAGNOSIS — M94.269 CHONDROMALACIA, UNSPECIFIED KNEE: ICD-10-CM

## 2025-02-20 PROCEDURE — 20610 DRAIN/INJ JOINT/BURSA W/O US: CPT | Mod: LT

## 2025-02-20 PROCEDURE — 99213 OFFICE O/P EST LOW 20 MIN: CPT | Mod: 25

## 2025-05-29 ENCOUNTER — APPOINTMENT (OUTPATIENT)
Facility: CLINIC | Age: 55
End: 2025-05-29

## 2025-05-29 DIAGNOSIS — M94.269 CHONDROMALACIA, UNSPECIFIED KNEE: ICD-10-CM

## 2025-05-29 PROCEDURE — 99213 OFFICE O/P EST LOW 20 MIN: CPT | Mod: 25

## 2025-05-29 PROCEDURE — 20610 DRAIN/INJ JOINT/BURSA W/O US: CPT | Mod: LT
